# Patient Record
Sex: MALE | Race: BLACK OR AFRICAN AMERICAN | NOT HISPANIC OR LATINO | URBAN - METROPOLITAN AREA
[De-identification: names, ages, dates, MRNs, and addresses within clinical notes are randomized per-mention and may not be internally consistent; named-entity substitution may affect disease eponyms.]

---

## 2021-12-13 ENCOUNTER — EMERGENCY VISIT (OUTPATIENT)
Dept: URBAN - METROPOLITAN AREA CLINIC 52 | Facility: CLINIC | Age: 76
End: 2021-12-13

## 2021-12-13 DIAGNOSIS — H00.14: ICD-10-CM

## 2021-12-13 PROCEDURE — 92012 INTRM OPH EXAM EST PATIENT: CPT

## 2021-12-13 ASSESSMENT — VISUAL ACUITY
OD_CC: 20/30-1
OS_CC: 20/30-2
OD_PH: 20/25
OS_PH: 20/25-2
OU_CC: J1

## 2021-12-13 ASSESSMENT — TONOMETRY
OD_IOP_MMHG: 10
OS_IOP_MMHG: 11

## 2021-12-21 ENCOUNTER — COMPREHENSIVE EXAM (OUTPATIENT)
Dept: URBAN - METROPOLITAN AREA CLINIC 53 | Facility: CLINIC | Age: 76
End: 2021-12-21

## 2021-12-21 DIAGNOSIS — R73.03: ICD-10-CM

## 2021-12-21 DIAGNOSIS — H00.14: ICD-10-CM

## 2021-12-21 PROCEDURE — 92014 COMPRE OPH EXAM EST PT 1/>: CPT

## 2021-12-21 ASSESSMENT — VISUAL ACUITY
OD_PH: 20/25
OS_GLARE: 20/50
OU_CC: J1+@ 14 IN
OD_CC: 20/25-2
OD_GLARE: 20/30
OD_GLARE: 20/40
OS_PH: 20/25
OS_GLARE: 20/40
OU_CC: 20/25
OS_CC: 20/30-2

## 2021-12-21 ASSESSMENT — TONOMETRY
OD_IOP_MMHG: 11
OS_IOP_MMHG: 11

## 2021-12-21 NOTE — PATIENT DISCUSSION
No evidence of diabetic retinopathy seen today.  discussed with patient to continue to monitor blood sugars and continue with PCP.

## 2022-12-20 ENCOUNTER — COMPREHENSIVE EXAM (OUTPATIENT)
Dept: URBAN - METROPOLITAN AREA CLINIC 53 | Facility: CLINIC | Age: 77
End: 2022-12-20

## 2022-12-20 PROCEDURE — 92015 DETERMINE REFRACTIVE STATE: CPT

## 2022-12-20 PROCEDURE — 92014 COMPRE OPH EXAM EST PT 1/>: CPT

## 2022-12-20 ASSESSMENT — VISUAL ACUITY
OD_GLARE: 20/30
OU_CC: J1 @ 18 IN
OS_PH: 20/40
OD_PH: 20/30
OS_GLARE: 20/30
OS_CC: 20/40-1
OU_CC: 20/30-1
OS_GLARE: 20/40
OD_GLARE: 20/25
OD_CC: 20/30-1

## 2022-12-20 ASSESSMENT — TONOMETRY
OS_IOP_MMHG: 12
OD_IOP_MMHG: 12

## 2024-01-09 ENCOUNTER — COMPREHENSIVE EXAM (OUTPATIENT)
Dept: URBAN - METROPOLITAN AREA CLINIC 53 | Facility: CLINIC | Age: 79
End: 2024-01-09

## 2024-01-09 DIAGNOSIS — H25.813: ICD-10-CM

## 2024-01-09 DIAGNOSIS — H11.153: ICD-10-CM

## 2024-01-09 DIAGNOSIS — R73.03: ICD-10-CM

## 2024-01-09 PROCEDURE — 99214 OFFICE O/P EST MOD 30 MIN: CPT

## 2024-01-09 PROCEDURE — 92015 DETERMINE REFRACTIVE STATE: CPT

## 2024-01-09 ASSESSMENT — VISUAL ACUITY
OS_GLARE: 20/30
OS_GLARE: 20/40
OD_PH: 20/20
OS_CC: J1@14"
OD_GLARE: 20/25
OD_GLARE: 20/20
OS_CC: 20/25-1
OD_CC: 20/30-2

## 2024-01-09 ASSESSMENT — KERATOMETRY
OD_K1POWER_DIOPTERS: 43.50
OS_K1POWER_DIOPTERS: 43.75
OS_K2POWER_DIOPTERS: 44.50
OD_AXISANGLE_DEGREES: 118
OS_AXISANGLE2_DEGREES: 70
OS_AXISANGLE_DEGREES: 160
OD_AXISANGLE2_DEGREES: 28
OD_K2POWER_DIOPTERS: 44.25

## 2024-01-09 ASSESSMENT — TONOMETRY
OD_IOP_MMHG: 12
OS_IOP_MMHG: 12

## 2024-06-27 ENCOUNTER — COMPREHENSIVE EXAM (OUTPATIENT)
Dept: URBAN - METROPOLITAN AREA CLINIC 49 | Facility: LOCATION | Age: 79
End: 2024-06-27

## 2024-06-27 DIAGNOSIS — H52.4: ICD-10-CM

## 2024-06-27 DIAGNOSIS — H25.813: ICD-10-CM

## 2024-06-27 DIAGNOSIS — H35.373: ICD-10-CM

## 2024-06-27 DIAGNOSIS — H18.463: ICD-10-CM

## 2024-06-27 DIAGNOSIS — H11.153: ICD-10-CM

## 2024-06-27 PROCEDURE — 92015 DETERMINE REFRACTIVE STATE: CPT

## 2024-06-27 PROCEDURE — 99214 OFFICE O/P EST MOD 30 MIN: CPT

## 2024-06-27 PROCEDURE — 92134 CPTRZ OPH DX IMG PST SGM RTA: CPT

## 2024-06-27 ASSESSMENT — VISUAL ACUITY
OS_GLARE: 20/40
OD_CC: 20/30-2
OS_GLARE: 20/40
OD_GLARE: 20/50
OD_GLARE: 20/50
OS_CC: 20/30-2

## 2024-06-27 ASSESSMENT — KERATOMETRY
OS_K1POWER_DIOPTERS: 43.75
OD_AXISANGLE2_DEGREES: 28
OD_K2POWER_DIOPTERS: 44.25
OD_AXISANGLE_DEGREES: 118
OS_K2POWER_DIOPTERS: 44.50
OD_K1POWER_DIOPTERS: 43.50
OS_AXISANGLE2_DEGREES: 70
OS_AXISANGLE_DEGREES: 160

## 2024-06-27 ASSESSMENT — TONOMETRY
OS_IOP_MMHG: 14
OD_IOP_MMHG: 14

## 2024-07-31 ENCOUNTER — PRE-OP/H&P (OUTPATIENT)
Dept: URBAN - METROPOLITAN AREA CLINIC 53 | Facility: CLINIC | Age: 79
End: 2024-07-31

## 2024-07-31 DIAGNOSIS — H25.13: ICD-10-CM

## 2024-07-31 DIAGNOSIS — H35.373: ICD-10-CM

## 2024-07-31 PROCEDURE — 92136 OPHTHALMIC BIOMETRY: CPT

## 2024-07-31 PROCEDURE — 92134 CPTRZ OPH DX IMG PST SGM RTA: CPT

## 2024-07-31 PROCEDURE — 92025IOL CORNEAL TOPOGRAPHY PREMIUM IOL

## 2024-07-31 PROCEDURE — 99214 OFFICE O/P EST MOD 30 MIN: CPT

## 2024-07-31 ASSESSMENT — KERATOMETRY
OD_K2POWER_DIOPTERS: 43.87
OS_K2POWER_DIOPTERS: 43.62
OD_K1POWER_DIOPTERS: 44.12
OD_AXISANGLE_DEGREES: 035
OD_AXISANGLE2_DEGREES: 125
OS_K1POWER_DIOPTERS: 43.75
OS_AXISANGLE2_DEGREES: 080
OS_AXISANGLE_DEGREES: 170

## 2024-07-31 ASSESSMENT — VISUAL ACUITY
OU_CC: 20/20
OD_CC: 20/25
OS_CC: 20/25

## 2024-07-31 ASSESSMENT — TONOMETRY
OD_IOP_MMHG: 11
OS_IOP_MMHG: 10

## 2024-08-08 ENCOUNTER — POST-OP (OUTPATIENT)
Dept: URBAN - METROPOLITAN AREA CLINIC 48 | Facility: LOCATION | Age: 79
End: 2024-08-08

## 2024-08-08 ENCOUNTER — SURGERY/PROCEDURE (OUTPATIENT)
Dept: URBAN - METROPOLITAN AREA SURGERY 16 | Facility: SURGERY | Age: 79
End: 2024-08-08

## 2024-08-08 DIAGNOSIS — Z98.41: ICD-10-CM

## 2024-08-08 DIAGNOSIS — Z96.1: ICD-10-CM

## 2024-08-08 DIAGNOSIS — H25.11: ICD-10-CM

## 2024-08-08 PROCEDURE — 66984CV REMOVE CATARACT, INSERT LENS, CUSTOM VISION

## 2024-08-08 PROCEDURE — 99199PCV CUSTOM VISION

## 2024-08-08 PROCEDURE — 99024 POSTOP FOLLOW-UP VISIT: CPT

## 2024-08-08 ASSESSMENT — KERATOMETRY
OD_AXISANGLE2_DEGREES: 125
OD_AXISANGLE_DEGREES: 035
OD_K1POWER_DIOPTERS: 44.12
OS_AXISANGLE2_DEGREES: 080
OS_AXISANGLE_DEGREES: 170
OS_K2POWER_DIOPTERS: 43.62
OS_K1POWER_DIOPTERS: 43.75
OD_K2POWER_DIOPTERS: 43.87

## 2024-08-08 ASSESSMENT — TONOMETRY: OD_IOP_MMHG: 16

## 2024-08-08 ASSESSMENT — VISUAL ACUITY: OD_SC: 20/80-1

## 2024-08-14 ENCOUNTER — POST OP/EVAL OF SECOND EYE (OUTPATIENT)
Dept: URBAN - METROPOLITAN AREA CLINIC 53 | Facility: CLINIC | Age: 79
End: 2024-08-14

## 2024-08-14 DIAGNOSIS — Z96.1: ICD-10-CM

## 2024-08-14 DIAGNOSIS — Z98.41: ICD-10-CM

## 2024-08-14 DIAGNOSIS — H25.12: ICD-10-CM

## 2024-08-14 PROCEDURE — 99024 POSTOP FOLLOW-UP VISIT: CPT

## 2024-08-14 PROCEDURE — 92136NC IOLMASTER - NO CHARGE

## 2024-08-14 ASSESSMENT — KERATOMETRY
OD_K2POWER_DIOPTERS: 44.25
OS_K2POWER_DIOPTERS: 44.75
OS_AXISANGLE_DEGREES: 166
OS_AXISANGLE2_DEGREES: 076
OD_AXISANGLE_DEGREES: 118
OD_AXISANGLE2_DEGREES: 028
OS_K1POWER_DIOPTERS: 43.50
OD_K1POWER_DIOPTERS: 43.25

## 2024-08-14 ASSESSMENT — VISUAL ACUITY
OD_SC: 20/50+2
OS_CC: 20/20-1
OD_SC: J2

## 2024-08-14 ASSESSMENT — TONOMETRY
OD_IOP_MMHG: 10
OS_IOP_MMHG: 10

## 2024-08-21 ASSESSMENT — KERATOMETRY
OS_K1POWER_DIOPTERS: 43.50
OD_K2POWER_DIOPTERS: 44.25
OD_K1POWER_DIOPTERS: 43.25
OS_AXISANGLE2_DEGREES: 076
OD_AXISANGLE2_DEGREES: 028
OD_AXISANGLE_DEGREES: 118
OS_K2POWER_DIOPTERS: 44.75
OS_AXISANGLE_DEGREES: 166

## 2024-08-22 ENCOUNTER — POST-OP (OUTPATIENT)
Dept: URBAN - METROPOLITAN AREA CLINIC 48 | Facility: LOCATION | Age: 79
End: 2024-08-22

## 2024-08-22 ENCOUNTER — SURGERY/PROCEDURE (OUTPATIENT)
Dept: URBAN - METROPOLITAN AREA SURGERY 16 | Facility: SURGERY | Age: 79
End: 2024-08-22

## 2024-08-22 DIAGNOSIS — H25.12: ICD-10-CM

## 2024-08-22 DIAGNOSIS — Z96.1: ICD-10-CM

## 2024-08-22 DIAGNOSIS — Z98.42: ICD-10-CM

## 2024-08-22 PROCEDURE — 99024 POSTOP FOLLOW-UP VISIT: CPT

## 2024-08-22 PROCEDURE — 66984CV REMOVE CATARACT, INSERT LENS, CUSTOM VISION: Mod: 79,LT

## 2024-08-22 PROCEDURE — 99199PCV CUSTOM VISION

## 2024-08-22 ASSESSMENT — TONOMETRY: OS_IOP_MMHG: 20

## 2024-08-22 ASSESSMENT — VISUAL ACUITY: OS_SC: 20/70

## 2024-08-28 ENCOUNTER — POST-OP (OUTPATIENT)
Dept: URBAN - METROPOLITAN AREA CLINIC 53 | Facility: CLINIC | Age: 79
End: 2024-08-28

## 2024-08-28 DIAGNOSIS — Z96.1: ICD-10-CM

## 2024-08-28 DIAGNOSIS — Z98.42: ICD-10-CM

## 2024-08-28 PROCEDURE — 99024 POSTOP FOLLOW-UP VISIT: CPT

## 2024-08-28 ASSESSMENT — VISUAL ACUITY
OS_SC: 20/25
OD_PH: 20/25
OU_SC: J7@16"
OD_SC: 20/40
OS_SC: J16@16"
OU_SC: 20/25

## 2024-08-28 ASSESSMENT — TONOMETRY
OS_IOP_MMHG: 12
OD_IOP_MMHG: 12

## 2024-09-18 ENCOUNTER — POST-OP (OUTPATIENT)
Dept: URBAN - METROPOLITAN AREA CLINIC 53 | Facility: CLINIC | Age: 79
End: 2024-09-18

## 2024-09-18 DIAGNOSIS — H35.373: ICD-10-CM

## 2024-09-18 DIAGNOSIS — Z98.41: ICD-10-CM

## 2024-09-18 DIAGNOSIS — Z96.1: ICD-10-CM

## 2024-09-18 DIAGNOSIS — Z98.42: ICD-10-CM

## 2024-09-18 DIAGNOSIS — H04.123: ICD-10-CM

## 2024-09-18 PROCEDURE — 99024 POSTOP FOLLOW-UP VISIT: CPT

## 2024-09-18 PROCEDURE — 92134 CPTRZ OPH DX IMG PST SGM RTA: CPT

## 2024-09-18 RX ORDER — WHITE PETROLATUM .15; .85 G/G; G/G: OINTMENT OPHTHALMIC EVERY EVENING

## 2024-10-01 ENCOUNTER — EMERGENCY VISIT (OUTPATIENT)
Dept: URBAN - METROPOLITAN AREA CLINIC 49 | Facility: LOCATION | Age: 79
End: 2024-10-01

## 2024-10-01 DIAGNOSIS — H04.123: ICD-10-CM

## 2024-10-01 DIAGNOSIS — H35.373: ICD-10-CM

## 2024-10-01 DIAGNOSIS — H20.041: ICD-10-CM

## 2024-10-01 PROCEDURE — 99213 OFFICE O/P EST LOW 20 MIN: CPT

## 2024-10-01 PROCEDURE — 92134 CPTRZ OPH DX IMG PST SGM RTA: CPT

## 2024-10-01 RX ORDER — PREDNISOLONE ACETATE 10 MG/ML
1 SUSPENSION/ DROPS OPHTHALMIC
Start: 2024-10-01

## 2024-10-29 ENCOUNTER — FOLLOW UP (OUTPATIENT)
Dept: URBAN - METROPOLITAN AREA CLINIC 49 | Facility: LOCATION | Age: 79
End: 2024-10-29

## 2024-10-29 DIAGNOSIS — H52.4: ICD-10-CM

## 2024-10-29 DIAGNOSIS — H20.041: ICD-10-CM

## 2024-10-29 DIAGNOSIS — H26.493: ICD-10-CM

## 2024-10-29 DIAGNOSIS — Z96.1: ICD-10-CM

## 2024-10-29 PROCEDURE — 99024 POSTOP FOLLOW-UP VISIT: CPT

## 2025-01-28 ENCOUNTER — COMPREHENSIVE EXAM (OUTPATIENT)
Age: 80
End: 2025-01-28

## 2025-01-28 DIAGNOSIS — R73.03: ICD-10-CM

## 2025-01-28 DIAGNOSIS — H02.831: ICD-10-CM

## 2025-01-28 DIAGNOSIS — H04.123: ICD-10-CM

## 2025-01-28 DIAGNOSIS — H02.834: ICD-10-CM

## 2025-01-28 DIAGNOSIS — H35.373: ICD-10-CM

## 2025-01-28 DIAGNOSIS — H11.153: ICD-10-CM

## 2025-01-28 DIAGNOSIS — H26.493: ICD-10-CM

## 2025-01-28 DIAGNOSIS — H35.033: ICD-10-CM

## 2025-01-28 PROCEDURE — 99214 OFFICE O/P EST MOD 30 MIN: CPT

## 2025-01-28 PROCEDURE — 92134 CPTRZ OPH DX IMG PST SGM RTA: CPT

## 2025-02-19 ENCOUNTER — FOLLOW UP (OUTPATIENT)
Age: 80
End: 2025-02-19

## 2025-02-19 DIAGNOSIS — H01.114: ICD-10-CM

## 2025-02-19 PROCEDURE — 99212 OFFICE O/P EST SF 10 MIN: CPT

## 2025-02-19 RX ORDER — LOTEPREDNOL ETABONATE 5 MG/G: OINTMENT OPHTHALMIC EVERY EVENING
